# Patient Record
Sex: MALE | Race: WHITE | NOT HISPANIC OR LATINO | URBAN - METROPOLITAN AREA
[De-identification: names, ages, dates, MRNs, and addresses within clinical notes are randomized per-mention and may not be internally consistent; named-entity substitution may affect disease eponyms.]

---

## 2017-05-05 ENCOUNTER — EMERGENCY (EMERGENCY)
Facility: HOSPITAL | Age: 47
LOS: 0 days | Discharge: HOME | End: 2017-05-05

## 2017-06-15 ENCOUNTER — EMERGENCY (EMERGENCY)
Facility: HOSPITAL | Age: 47
LOS: 1 days | Discharge: HOME | End: 2017-06-15
Admitting: EMERGENCY MEDICINE
Payer: MEDICAID

## 2017-06-15 VITALS
HEART RATE: 71 BPM | DIASTOLIC BLOOD PRESSURE: 98 MMHG | WEIGHT: 229.94 LBS | TEMPERATURE: 98 F | RESPIRATION RATE: 16 BRPM | OXYGEN SATURATION: 95 % | SYSTOLIC BLOOD PRESSURE: 153 MMHG

## 2017-06-15 DIAGNOSIS — Z79.2 LONG TERM (CURRENT) USE OF ANTIBIOTICS: ICD-10-CM

## 2017-06-15 DIAGNOSIS — M79.671 PAIN IN RIGHT FOOT: ICD-10-CM

## 2017-06-15 DIAGNOSIS — Z79.1 LONG TERM (CURRENT) USE OF NON-STEROIDAL ANTI-INFLAMMATORIES (NSAID): ICD-10-CM

## 2017-06-15 DIAGNOSIS — M79.672 PAIN IN LEFT FOOT: ICD-10-CM

## 2017-06-15 DIAGNOSIS — Z87.891 PERSONAL HISTORY OF NICOTINE DEPENDENCE: ICD-10-CM

## 2017-06-15 PROCEDURE — 99283 EMERGENCY DEPT VISIT LOW MDM: CPT | Mod: 25

## 2017-06-15 RX ORDER — LEVOTHYROXINE SODIUM 125 MCG
1 TABLET ORAL
Qty: 0 | Refills: 0 | COMMUNITY

## 2017-06-15 RX ORDER — ACETAMINOPHEN 500 MG
650 TABLET ORAL ONCE
Qty: 0 | Refills: 0 | Status: COMPLETED | OUTPATIENT
Start: 2017-06-15 | End: 2017-06-15

## 2017-06-15 RX ADMIN — Medication 650 MILLIGRAM(S): at 23:40

## 2017-06-15 NOTE — ED PROVIDER NOTE - MEDICAL DECISION MAKING DETAILS
patient homeless with BL foot pain requesting to sleep in ED. advised elevation and follow up with PCP

## 2017-06-15 NOTE — ED PROVIDER NOTE - OBJECTIVE STATEMENT
patient homeless lives in shelter presents with BL foot pain from excessive walking. denies trauma or fall. denies rash, lesions, fever, chills, nightsweats. patient is requesting to sleep in ED as he does not like his shelter

## 2017-06-28 DIAGNOSIS — E03.9 HYPOTHYROIDISM, UNSPECIFIED: ICD-10-CM

## 2017-06-28 DIAGNOSIS — M79.89 OTHER SPECIFIED SOFT TISSUE DISORDERS: ICD-10-CM

## 2017-06-28 DIAGNOSIS — M79.673 PAIN IN UNSPECIFIED FOOT: ICD-10-CM

## 2017-06-28 DIAGNOSIS — M54.9 DORSALGIA, UNSPECIFIED: ICD-10-CM

## 2017-06-28 DIAGNOSIS — I10 ESSENTIAL (PRIMARY) HYPERTENSION: ICD-10-CM

## 2017-06-28 DIAGNOSIS — Z79.899 OTHER LONG TERM (CURRENT) DRUG THERAPY: ICD-10-CM

## 2020-08-13 NOTE — ED ADULT NURSE NOTE - NS ED NURSE DISCH DISPOSITION
Discharged Consent (Scalp)/Introductory Paragraph: The rationale for Mohs was explained to the patient and consent was obtained. The risks, benefits and alternatives to therapy were discussed in detail. Specifically, the risks of changes in hair growth pattern secondary to repair, infection, scarring, bleeding, prolonged wound healing, incomplete removal, allergy to anesthesia, nerve injury and recurrence were addressed. Prior to the procedure, the treatment site was clearly identified and confirmed by the patient. All components of Universal Protocol/PAUSE Rule completed.

## 2020-09-21 ENCOUNTER — EMERGENCY (EMERGENCY)
Facility: HOSPITAL | Age: 50
LOS: 1 days | Discharge: HOME | End: 2020-09-21
Attending: EMERGENCY MEDICINE | Admitting: EMERGENCY MEDICINE
Payer: MEDICAID

## 2020-09-21 VITALS
SYSTOLIC BLOOD PRESSURE: 132 MMHG | TEMPERATURE: 98 F | RESPIRATION RATE: 18 BRPM | HEART RATE: 98 BPM | OXYGEN SATURATION: 98 % | HEIGHT: 72 IN | WEIGHT: 274.92 LBS | DIASTOLIC BLOOD PRESSURE: 88 MMHG

## 2020-09-21 DIAGNOSIS — M79.671 PAIN IN RIGHT FOOT: ICD-10-CM

## 2020-09-21 DIAGNOSIS — M79.672 PAIN IN LEFT FOOT: ICD-10-CM

## 2020-09-21 PROCEDURE — 99283 EMERGENCY DEPT VISIT LOW MDM: CPT

## 2020-09-21 NOTE — ED PROVIDER NOTE - PATIENT PORTAL LINK FT
You can access the FollowMyHealth Patient Portal offered by [facility] by registering at the following website: [email or web address]. By joining [employer]’s FollowMyHealth portal, you will also be able to view your health information using other applications (apps) compatible with our system.

## 2020-09-21 NOTE — ED PROVIDER NOTE - NSFOLLOWUPINSTRUCTIONS_ED_ALL_ED_FT
Follow up with your primary care doctor or clinics listed below if you do not have a doctor  [address] [address]  To make an appointment, call [phone]   [facility]  Address: [address] [place] [id number]  Appointment Center: [phone] ([phone])  Return immediately for any new or worsening symptoms or any new concerns

## 2020-09-21 NOTE — ED PROVIDER NOTE - OBJECTIVE STATEMENT
49 yom pw bl foot pain, "for a while" but worse x 1 wk, does not endorse specific details of sx.  pt states he thinks it's from neuropathy but never formally diagnosed.  denies any trauma/injury.  when asked about other medical history, only wants to talk about the foot pain.

## 2020-09-21 NOTE — ED PROVIDER NOTE - CLINICAL SUMMARY MEDICAL DECISION MAKING FREE TEXT BOX
foot pain "for a while" but worsening x 1 wk, denies any trauma/injury, does not endorse other details, does not endorse PMH, does not wish to take off shoes/socks for evaluation, understands workup is incomplete and may miss potentially life threatening conditions.  understands risks of incomplete evaluation, which can include but not limited to permanent disability, and/or death.  pt appear nad, ao x 3, clear speech, verbalizes understanding of discussion.

## 2020-09-21 NOTE — ED ADULT TRIAGE NOTE - CHIEF COMPLAINT QUOTE
Pt walks in w/ EMS c/o B/L foot pain r/t neuropathy. Pt took 300mg gabapentin 20 min PTA. Pt denies recent injury. PMH of HTN and hypothyroidism.

## 2020-09-21 NOTE — ED PROVIDER NOTE - PHYSICAL EXAMINATION
Physical Exam  GEN: Awake, alert, non-toxic appearing, NCAT  EYES: full EOMI,  ENT: External inspection normal, normal voice,   HEAD: atraumatic  NECK: FROM neck, supple,   RESP: no tachypnea, no hypoxia, no resp distress,  MSK: does not take off shoes/socks for evaluation, seen ambulating steadily w/o difficulty  SKIN: does not take off shoes/socks for evaluation,

## 2020-09-21 NOTE — ED PROVIDER NOTE - PROGRESS NOTE DETAILS
at time of dc, pt amenable to take off shoes/socks to RN.  on eval, normal skin color, normal cap refill, pedal pulses palpable bl equal, no gangrene, no bulla, no erythema, compartment soft, normal ankle dorsiplantarflexion, no acute ischemia or evidence of infection, discussed f/u w/ outpatient primary care at time of dc, pt amenable to take off shoes/socks to RN.  on eval, normal skin color, normal cap refill, pedal pulses palpable bl equal, no gangrene, no bulla, no erythema, compartment soft, normal ankle dorsiplantarflexion, no acute ischemia or evidence of infection, discussed f/u w/ outpatient primary care.  pt still did not endorse more details of pmh/history.

## 2020-09-21 NOTE — ED ADULT TRIAGE NOTE - PAIN RATING/NUMBER SCALE (0-10): REST
Pt had a Td 8-9-19     Called pt cell # notified of difference of Ts vs Tdap    She is not worried about pertussis as no grandchildren or none expected.  She will see / consider when her next tetanus is due           6

## 2020-09-25 ENCOUNTER — EMERGENCY (EMERGENCY)
Facility: HOSPITAL | Age: 50
LOS: 1 days | Discharge: HOME | End: 2020-09-25
Attending: EMERGENCY MEDICINE | Admitting: EMERGENCY MEDICINE
Payer: MEDICAID

## 2020-09-25 VITALS
HEART RATE: 88 BPM | SYSTOLIC BLOOD PRESSURE: 132 MMHG | RESPIRATION RATE: 18 BRPM | DIASTOLIC BLOOD PRESSURE: 78 MMHG | OXYGEN SATURATION: 99 % | TEMPERATURE: 98 F

## 2020-09-25 VITALS
RESPIRATION RATE: 18 BRPM | SYSTOLIC BLOOD PRESSURE: 113 MMHG | TEMPERATURE: 98 F | OXYGEN SATURATION: 98 % | HEART RATE: 71 BPM | WEIGHT: 274.92 LBS | HEIGHT: 72 IN | DIASTOLIC BLOOD PRESSURE: 73 MMHG

## 2020-09-25 LAB
ALBUMIN SERPL ELPH-MCNC: 3.8 G/DL — SIGNIFICANT CHANGE UP (ref 3.4–5)
ALP SERPL-CCNC: 57 U/L — SIGNIFICANT CHANGE UP (ref 40–120)
ALT FLD-CCNC: 34 U/L — SIGNIFICANT CHANGE UP (ref 12–42)
ANION GAP SERPL CALC-SCNC: 7 MMOL/L — LOW (ref 9–16)
AST SERPL-CCNC: 29 U/L — SIGNIFICANT CHANGE UP (ref 15–37)
BASOPHILS # BLD AUTO: 0.08 K/UL — SIGNIFICANT CHANGE UP (ref 0–0.2)
BASOPHILS NFR BLD AUTO: 1 % — SIGNIFICANT CHANGE UP (ref 0–2)
BILIRUB SERPL-MCNC: 0.4 MG/DL — SIGNIFICANT CHANGE UP (ref 0.2–1.2)
BUN SERPL-MCNC: 27 MG/DL — HIGH (ref 7–23)
CALCIUM SERPL-MCNC: 9.2 MG/DL — SIGNIFICANT CHANGE UP (ref 8.5–10.5)
CHLORIDE SERPL-SCNC: 103 MMOL/L — SIGNIFICANT CHANGE UP (ref 96–108)
CO2 SERPL-SCNC: 27 MMOL/L — SIGNIFICANT CHANGE UP (ref 22–31)
CREAT SERPL-MCNC: 1.17 MG/DL — SIGNIFICANT CHANGE UP (ref 0.5–1.3)
EOSINOPHIL # BLD AUTO: 0.28 K/UL — SIGNIFICANT CHANGE UP (ref 0–0.5)
EOSINOPHIL NFR BLD AUTO: 3.6 % — SIGNIFICANT CHANGE UP (ref 0–6)
GLUCOSE BLDC GLUCOMTR-MCNC: 113 MG/DL — HIGH (ref 70–99)
GLUCOSE SERPL-MCNC: 112 MG/DL — HIGH (ref 70–99)
HCT VFR BLD CALC: 39.7 % — SIGNIFICANT CHANGE UP (ref 39–50)
HGB BLD-MCNC: 13.2 G/DL — SIGNIFICANT CHANGE UP (ref 13–17)
IMM GRANULOCYTES NFR BLD AUTO: 0.3 % — SIGNIFICANT CHANGE UP (ref 0–1.5)
LYMPHOCYTES # BLD AUTO: 3.16 K/UL — SIGNIFICANT CHANGE UP (ref 1–3.3)
LYMPHOCYTES # BLD AUTO: 40.5 % — SIGNIFICANT CHANGE UP (ref 13–44)
MCHC RBC-ENTMCNC: 29.5 PG — SIGNIFICANT CHANGE UP (ref 27–34)
MCHC RBC-ENTMCNC: 33.2 GM/DL — SIGNIFICANT CHANGE UP (ref 32–36)
MCV RBC AUTO: 88.6 FL — SIGNIFICANT CHANGE UP (ref 80–100)
MONOCYTES # BLD AUTO: 0.89 K/UL — SIGNIFICANT CHANGE UP (ref 0–0.9)
MONOCYTES NFR BLD AUTO: 11.4 % — SIGNIFICANT CHANGE UP (ref 2–14)
NEUTROPHILS # BLD AUTO: 3.37 K/UL — SIGNIFICANT CHANGE UP (ref 1.8–7.4)
NEUTROPHILS NFR BLD AUTO: 43.2 % — SIGNIFICANT CHANGE UP (ref 43–77)
NRBC # BLD: 0 /100 WBCS — SIGNIFICANT CHANGE UP (ref 0–0)
PLATELET # BLD AUTO: 263 K/UL — SIGNIFICANT CHANGE UP (ref 150–400)
POTASSIUM SERPL-MCNC: 3.6 MMOL/L — SIGNIFICANT CHANGE UP (ref 3.5–5.3)
POTASSIUM SERPL-SCNC: 3.6 MMOL/L — SIGNIFICANT CHANGE UP (ref 3.5–5.3)
PROT SERPL-MCNC: 7.4 G/DL — SIGNIFICANT CHANGE UP (ref 6.4–8.2)
RBC # BLD: 4.48 M/UL — SIGNIFICANT CHANGE UP (ref 4.2–5.8)
RBC # FLD: 12.3 % — SIGNIFICANT CHANGE UP (ref 10.3–14.5)
SODIUM SERPL-SCNC: 137 MMOL/L — SIGNIFICANT CHANGE UP (ref 132–145)
WBC # BLD: 7.8 K/UL — SIGNIFICANT CHANGE UP (ref 3.8–10.5)
WBC # FLD AUTO: 7.8 K/UL — SIGNIFICANT CHANGE UP (ref 3.8–10.5)

## 2020-09-25 PROCEDURE — 71045 X-RAY EXAM CHEST 1 VIEW: CPT | Mod: 26

## 2020-09-25 PROCEDURE — 99284 EMERGENCY DEPT VISIT MOD MDM: CPT | Mod: 25

## 2020-09-25 RX ORDER — ALBUTEROL 90 UG/1
2 AEROSOL, METERED ORAL
Qty: 1 | Refills: 0
Start: 2020-09-25 | End: 2020-10-04

## 2020-09-25 RX ORDER — AZITHROMYCIN 500 MG/1
1 TABLET, FILM COATED ORAL
Qty: 4 | Refills: 0
Start: 2020-09-25 | End: 2020-09-28

## 2020-09-25 RX ORDER — AZITHROMYCIN 500 MG/1
500 TABLET, FILM COATED ORAL ONCE
Refills: 0 | Status: COMPLETED | OUTPATIENT
Start: 2020-09-25 | End: 2020-09-25

## 2020-09-25 RX ORDER — ALBUTEROL 90 UG/1
2 AEROSOL, METERED ORAL ONCE
Refills: 0 | Status: COMPLETED | OUTPATIENT
Start: 2020-09-25 | End: 2020-09-25

## 2020-09-25 RX ADMIN — Medication 200 MILLIGRAM(S): at 22:25

## 2020-09-25 RX ADMIN — AZITHROMYCIN 500 MILLIGRAM(S): 500 TABLET, FILM COATED ORAL at 22:25

## 2020-09-25 RX ADMIN — ALBUTEROL 2 PUFF(S): 90 AEROSOL, METERED ORAL at 22:25

## 2020-09-25 NOTE — ED ADULT NURSE NOTE - CHPI ED NUR SYMPTOMS NEG
no confusion/no weakness/no nausea/no fever/no vomiting/no blurred vision/no change in level of consciousness/no numbness

## 2020-09-25 NOTE — ED ADULT TRIAGE NOTE - NS ED TRIAGE AVPU SCALE
Detail Level: Zone Alert-The patient is alert, awake and responds to voice. The patient is oriented to time, place, and person. The triage nurse is able to obtain subjective information.

## 2020-09-25 NOTE — ED ADULT TRIAGE NOTE - CHIEF COMPLAINT QUOTE
Pt BIBA c/o dizziness and weakness since today. Pt states he has been "on his feet" for 3 days straight and is exhausted. Pt also c/o B/L foot pain. Per EMS, pt ambulating only with 2 person assist. Pt denies falling down/hitting head. PMH of HTN and hypothyroidism.

## 2020-09-25 NOTE — ED PROVIDER NOTE - OBJECTIVE STATEMENT
49 y.o. male with c/o 2-3 days dry cough, weakness dizziness, he is homeless and sleeping in the street, denies fever/chills, no vomiting.

## 2020-09-25 NOTE — ED PROVIDER NOTE - PATIENT PORTAL LINK FT
You can access the FollowMyHealth Patient Portal offered by [facility] by registering at the following website: [email or web address]. By joining [employer]’s FollowMyHealth portal, you will also be able to view your health information using other applications (apps) compatible with our system. You can access the FollowMyHealth Patient Portal offered by [facility] by registering at the following website: [email or web address]. By joining [employer]’s FollowMyHealth portal, you will also be able to view your health information using other applications (apps) compatible with our system.

## 2020-09-25 NOTE — ED PROVIDER NOTE - NSFOLLOWUPINSTRUCTIONS_ED_ALL_ED_FT
Acute Bronchitis    WHAT YOU NEED TO KNOW:    Acute bronchitis is swelling and irritation in the air passages of your lungs. This irritation may cause you to cough or have other breathing problems. Acute bronchitis often starts because of another illness, such as a cold or the flu. The illness spreads from your nose and throat to your windpipe and airways. Bronchitis is often called a chest cold. Acute bronchitis lasts about 3 to 6 weeks and is usually not a serious illness. Your cough can last for several weeks.     DISCHARGE INSTRUCTIONS:    Return to the emergency department if:     You cough up blood.      Your lips or fingernails turn blue.      You feel like you are not getting enough air when you breathe.    Contact your healthcare provider if:     You have a fever.      Your breathing problems do not go away or get worse.      Your cough does not get better within 4 weeks.      You have questions or concerns about your condition or care.    Self-care:     Get more rest. Rest helps your body to heal. Slowly start to do more each day. Rest when you feel it is needed.      Avoid irritants in the air. Avoid chemicals, fumes, and dust. Wear a face mask if you must work around dust or fumes. Stay inside on days when air pollution levels are high. If you have allergies, stay inside when pollen counts are high. Do not use aerosol products, such as spray-on deodorant, bug spray, and hair spray.      Do not smoke or be around others who smoke. Nicotine and other chemicals in cigarettes and cigars damages the cilia that move mucus out of your lungs. Ask your healthcare provider for information if you currently smoke and need help to quit. E-cigarettes or smokeless tobacco still contain nicotine. Talk to your healthcare provider before you use these products.       Drink liquids as directed. Liquids help keep your air passages moist and help you cough up mucus. You may need to drink more liquids when you have acute bronchitis. Ask how much liquid to drink each day and which liquids are best for you.      Use a humidifier or vaporizer. Use a cool mist humidifier or a vaporizer to increase air moisture in your home. This may make it easier for you to breathe and help decrease your cough.     Decrease risk for acute bronchitis:     Get the vaccinations you need. Ask your healthcare provider if you should get vaccinated against the flu or pneumonia.      Prevent the spread of germs. You can decrease your risk of acute bronchitis and other illnesses by doing the following:   Wash your hands often with soap and water. Carry germ-killing hand lotion or gel with you. You can use the lotion or gel to clean your hands when soap and water are not available.      Do not touch your eyes, nose, or mouth unless you have washed your hands first.      Always cover your mouth when you cough to prevent the spread of germs. It is best to cough into a tissue or your shirt sleeve instead of into your hand. Ask those around you cover their mouths when they cough.      Try to avoid people who have a cold or the flu. If you are sick, stay away from others as much as possible.    Medicines: Your healthcare provider may give you any of the following:     Ibuprofen or acetaminophen are medicines that help lower your fever. They are available without a doctor's order. Ask your healthcare provider which medicine is right for you. Ask how much to take and how often to take it. Follow directions. These medicines can cause stomach bleeding if not taken correctly. Ibuprofen can cause kidney damage. Do not take ibuprofen if you have kidney disease, an ulcer, or allergies to aspirin. Acetaminophen can cause liver damage. Do not take more than 4,000 milligrams in 24 hours.       Decongestants help loosen mucus in your lungs and make it easier to cough up. This can help you breathe easier.      Cough suppressants decrease your urge to cough. If your cough produces mucus, do not take a cough suppressant unless your healthcare provider tells you to. Your healthcare provider may suggest that you take a cough suppressant at night so you can rest.      Inhalers may be given. Your healthcare provider may give you one or more inhalers to help you breathe easier and cough less. An inhaler gives your medicine to open your airways. Ask your healthcare provider to show you how to use your inhaler correctly.Metered Dose Inhaler           Take your medicine as directed. Contact your healthcare provider if you think your medicine is not helping or if you have side effects. Tell him of her if you are allergic to any medicine. Keep a list of the medicines, vitamins, and herbs you take. Include the amounts, and when and why you take them. Bring the list or the pill bottles to follow-up visits. Carry your medicine list with you in case of an emergency.    Follow up with your healthcare provider as directed: Write down questions you have so you will remember to ask them during your follow-up visits    You may have Coronavirus, or any of the other many colds that are going around now.     COVID-19 testing are currently being prioritized at [facility] for admitted patients.     All patients that are stable are being discharged from the ED, even if there is a concern for coronavirus. Since you are stable, you are being discharged. Your test results may take 5-7 days. You will get a phone call for postive results. Not for negative results.  Please check the patient online portal for results. Please follow the instructions on provided coronavirus discharge educational forms and self quarantine for 14 days.     In addition, you have been placed on our surveillance tracker. Return to the ED immediately if you have shortness of breath, fever, pain, weakness, vomiting any concerns.    1. STAY HOME for 14 DAYS  2. Minimize Human contact to ONLY ESSENTIAL  3. Every time you wash your hands, sing the HAPPY BIRTHDAY Song so you know you're washing long enough.  Make sure to scrub the webspace between your fingers.  4. DRINK 1-3 Liters of fluids day x at least 5 days.  To remain hydrated. Your fatigue, lightheadedness, and body aches will decrease and your fever has a better chance of breaking if you are well hydrated.    5. For your Fever and Body aches takes Tylenol 650-100mg every 4-6h (max 4000mg/day). Try not to use ibuprofen, aspirin or naproxen (Advil, Motrin or Aleve) as these may worsen Coronavirus infection.  6. Use an inhaler for mild shortness of breath and cough  7. RETURN TO THE ER IMMEDIATELY IF YOU HAVE WORSENING SHORTNESS OF BREATH  8. TAKE THE FOLLOWING SUPPLEMENTS DAILY.        VITAMIN C 1000MG ONCE DAILY.        VITAMIN D 200IU ONCE DAILY.        ZINC 50MG ONCE DAILY.

## 2020-09-25 NOTE — ED PROVIDER NOTE - PROGRESS NOTE DETAILS
Accept sign out from Dr. Hillman.  pt awaiting covid result prior to discharge.  Currently resting comfortably.

## 2020-09-26 ENCOUNTER — EMERGENCY (EMERGENCY)
Facility: HOSPITAL | Age: 50
LOS: 0 days | Discharge: LEFT BEFORE DISCHARGE | End: 2020-09-26
Attending: EMERGENCY MEDICINE | Admitting: EMERGENCY MEDICINE
Payer: MEDICAID

## 2020-09-26 VITALS
RESPIRATION RATE: 20 BRPM | TEMPERATURE: 97 F | DIASTOLIC BLOOD PRESSURE: 69 MMHG | WEIGHT: 270.07 LBS | HEART RATE: 79 BPM | SYSTOLIC BLOOD PRESSURE: 119 MMHG | HEIGHT: 72 IN | OXYGEN SATURATION: 97 %

## 2020-09-26 DIAGNOSIS — E78.5 HYPERLIPIDEMIA, UNSPECIFIED: ICD-10-CM

## 2020-09-26 DIAGNOSIS — Z79.899 OTHER LONG TERM (CURRENT) DRUG THERAPY: ICD-10-CM

## 2020-09-26 DIAGNOSIS — M79.606 PAIN IN LEG, UNSPECIFIED: ICD-10-CM

## 2020-09-26 DIAGNOSIS — F17.200 NICOTINE DEPENDENCE, UNSPECIFIED, UNCOMPLICATED: ICD-10-CM

## 2020-09-26 DIAGNOSIS — I10 ESSENTIAL (PRIMARY) HYPERTENSION: ICD-10-CM

## 2020-09-26 DIAGNOSIS — R53.1 WEAKNESS: ICD-10-CM

## 2020-09-26 LAB — SARS-COV-2 RNA SPEC QL NAA+PROBE: SIGNIFICANT CHANGE UP

## 2020-09-26 PROCEDURE — 99284 EMERGENCY DEPT VISIT MOD MDM: CPT

## 2020-09-26 RX ORDER — KETOROLAC TROMETHAMINE 30 MG/ML
30 SYRINGE (ML) INJECTION ONCE
Refills: 0 | Status: DISCONTINUED | OUTPATIENT
Start: 2020-09-26 | End: 2020-09-26

## 2020-09-26 RX ADMIN — Medication 30 MILLIGRAM(S): at 19:00

## 2020-09-26 NOTE — ED ADULT NURSE NOTE - PMH
High cholesterol    HTN (hypertension)    Neuropathy    No pertinent past medical history    Thyroid disease

## 2020-09-26 NOTE — ED PROVIDER NOTE - ATTENDING CONTRIBUTION TO CARE
I personally evaluated the patient. I reviewed the Resident’s or Physician Assistant’s note (as assigned above), and agree with the findings and plan except as documented in my note.  Chart reviewed. H/O HTN, HLD, neuropathy, presents with leg pain for a while. No trauma. Exam shows alert patient in no distress, HEENT NCAT, lungs clear, RR S1S2, abdomen soft Nt +BS, no CCE, 2+ pulses, neuro A&OX3 no deficits.

## 2020-09-28 DIAGNOSIS — Z79.899 OTHER LONG TERM (CURRENT) DRUG THERAPY: ICD-10-CM

## 2020-09-28 DIAGNOSIS — J20.9 ACUTE BRONCHITIS, UNSPECIFIED: ICD-10-CM

## 2020-09-28 DIAGNOSIS — R42 DIZZINESS AND GIDDINESS: ICD-10-CM

## 2020-09-28 DIAGNOSIS — Z20.828 CONTACT WITH AND (SUSPECTED) EXPOSURE TO OTHER VIRAL COMMUNICABLE DISEASES: ICD-10-CM

## 2021-08-22 ENCOUNTER — EMERGENCY (EMERGENCY)
Facility: HOSPITAL | Age: 51
LOS: 0 days | Discharge: HOME | End: 2021-08-22
Attending: STUDENT IN AN ORGANIZED HEALTH CARE EDUCATION/TRAINING PROGRAM | Admitting: STUDENT IN AN ORGANIZED HEALTH CARE EDUCATION/TRAINING PROGRAM
Payer: MEDICAID

## 2021-08-22 VITALS
SYSTOLIC BLOOD PRESSURE: 148 MMHG | DIASTOLIC BLOOD PRESSURE: 76 MMHG | RESPIRATION RATE: 18 BRPM | HEART RATE: 70 BPM | HEIGHT: 72 IN | OXYGEN SATURATION: 97 % | TEMPERATURE: 98 F

## 2021-08-22 DIAGNOSIS — M79.671 PAIN IN RIGHT FOOT: ICD-10-CM

## 2021-08-22 DIAGNOSIS — Z86.69 PERSONAL HISTORY OF OTHER DISEASES OF THE NERVOUS SYSTEM AND SENSE ORGANS: ICD-10-CM

## 2021-08-22 DIAGNOSIS — E78.00 PURE HYPERCHOLESTEROLEMIA, UNSPECIFIED: ICD-10-CM

## 2021-08-22 DIAGNOSIS — M79.672 PAIN IN LEFT FOOT: ICD-10-CM

## 2021-08-22 DIAGNOSIS — Z86.39 PERSONAL HISTORY OF OTHER ENDOCRINE, NUTRITIONAL AND METABOLIC DISEASE: ICD-10-CM

## 2021-08-22 DIAGNOSIS — Z79.890 HORMONE REPLACEMENT THERAPY: ICD-10-CM

## 2021-08-22 DIAGNOSIS — I10 ESSENTIAL (PRIMARY) HYPERTENSION: ICD-10-CM

## 2021-08-22 DIAGNOSIS — E78.5 HYPERLIPIDEMIA, UNSPECIFIED: ICD-10-CM

## 2021-08-22 DIAGNOSIS — G89.29 OTHER CHRONIC PAIN: ICD-10-CM

## 2021-08-22 PROBLEM — G62.9 POLYNEUROPATHY, UNSPECIFIED: Chronic | Status: ACTIVE | Noted: 2020-09-26

## 2021-08-22 PROCEDURE — 99284 EMERGENCY DEPT VISIT MOD MDM: CPT

## 2021-08-22 RX ORDER — IBUPROFEN 200 MG
800 TABLET ORAL ONCE
Refills: 0 | Status: COMPLETED | OUTPATIENT
Start: 2021-08-22 | End: 2021-08-22

## 2021-08-22 RX ORDER — GABAPENTIN 400 MG/1
300 CAPSULE ORAL ONCE
Refills: 0 | Status: COMPLETED | OUTPATIENT
Start: 2021-08-22 | End: 2021-08-22

## 2021-08-22 RX ADMIN — Medication 800 MILLIGRAM(S): at 18:07

## 2021-08-22 RX ADMIN — GABAPENTIN 300 MILLIGRAM(S): 400 CAPSULE ORAL at 18:08

## 2021-08-22 NOTE — ED PROVIDER NOTE - ATTENDING CONTRIBUTION TO CARE
50 yr old m w/ a pmh significant for HTN, HLD, neuropathy, who presents with bilateral feet pain. Pt states that the pain is chronic and due to his neuropathy. Pt denies any trauma to the area. Pt states that he would like to get a referral for pcp, and neurology for this complaint. Of note, pt states that he did not take his gabapentin today.   Pt denies any other medical complaints.     VITAL SIGNS: I have reviewed nursing notes and confirm.  CONSTITUTIONAL: non-toxic, well appearing  SKIN: no rash, no petechiae.  EYES: EOMI, pink conjunctiva, anicteric  ENT: tongue midline, no exudates, MMM  NECK: Supple; no meningismus, no JVD  RESP: no respiratory distress  EXT: Normal ROM x4. No edema. No calves tenderness. dp+2 sensation intact in the lower extremities, no signs of trauma to the feet bilaterally, no ulcerations, abrasions or lacerations   NEURO: Alert, oriented. CN2-12 intact, equal strength bilaterally, nl gait.  PSYCH: Cooperative, appropriate.    a/p  50 yr old m that presents with chronic bilateral foot pain. will give pt pain meds. dc pt with pcp and neurology follow up and strict return precautions.

## 2021-08-22 NOTE — ED ADULT TRIAGE NOTE - CHIEF COMPLAINT QUOTE
BIBA as per patient "Both my feet hurt me on and off for years, they don't really know why, both feet hurt today. It's the same pain I get on and off". Denies fall/trauma.

## 2021-08-22 NOTE — ED PROVIDER NOTE - CLINICAL SUMMARY MEDICAL DECISION MAKING FREE TEXT BOX
50 yr old m that presents with chronic bilateral foot pain. will give pt pain meds. dc pt with pcp and neurology follow up and strict return precautions.

## 2021-08-22 NOTE — ED PROVIDER NOTE - NSICDXPASTMEDICALHX_GEN_ALL_CORE_FT
PAST MEDICAL HISTORY:  High cholesterol     HTN (hypertension)     Neuropathy     No pertinent past medical history     Thyroid disease

## 2021-08-22 NOTE — ED PROVIDER NOTE - OBJECTIVE STATEMENT
Pt is a 49y/o male with a pmh significant for HTN, HLD, neuropathy, who presents with bilateral feet pain. Pt states that the pain is chronic and due to his neuropathy. Pt states that he did not take his gabapentin today. Denies fever, chills, trauma, weakness, numbness

## 2021-08-22 NOTE — ED PROVIDER NOTE - PATIENT PORTAL LINK FT
You can access the FollowMyHealth Patient Portal offered by [facility] by registering at the following website: [email or web address]. By joining [employer]’s FollowMyHealth portal, you will also be able to view your health information using other applications (apps) compatible with our system.

## 2021-08-22 NOTE — ED PROVIDER NOTE - NSFOLLOWUPCLINICS_GEN_ALL_ED_FT
[facility]  Podiatry  .  [place]   Phone: [phone]  Fax:

## 2021-08-22 NOTE — ED PROVIDER NOTE - NS ED ROS FT
MS:  + foot pain No myalgia, muscle weakness, back pain.  Neuro:  No headache or weakness.  No LOC.  Skin:  No skin rash.   Endocrine: No history of thyroid disease or diabetes.  Except as documented in the HPI,  all other systems are negative.

## 2021-10-28 NOTE — ED ADULT NURSE NOTE - PT NEEDS ASSIST
unable to interview pt. at this time. will attempt to give education on diet at a later date. Available prn.
no

## 2022-01-12 NOTE — ED ADULT TRIAGE NOTE - RESPIRATORY RATE (BREATHS/MIN)
18 Retinoid Dermatitis Aggressive Treatment: I recommended more frequent application of Cetaphil or CeraVe to the areas of dermatitis. I also prescribed a topical steroid for twice daily use until the dermatitis resolves.

## 2024-12-31 NOTE — ED ADULT NURSE NOTE - FALL HARM RISK CONCLUSION
During your pre-operative assessment today you received a sleep apnea screening.  Based on your score, it is recommended that you follow up with your primary care provider for further evaluation.       Elevate left hand above elbow, elbow above heart all times.  Exercise fingers and thumb, bending and straightening the fingers  No lifting, pushing pulling >1lb  Avoid pressure on palm  May use fingertips as tolerated.   Keep dressing clean,dry, intact until Friday. Remove and replace with band-aid or gauze.   Cover w/ plastic bag for shower to keep dry.  Work: right hand full, left hand assist only    Follow-up with Dr. Burton's Nurse Practitioner, Emely Lee, at 2 weeks for suture removal.      If you have any concerns following your Procedure/Surgery and cannot reach your physician's office, please contact [facility] at [phone] for assistance.        Care After Anesthesia or Sedation    After discharge   Due to the medicine given, someone must drive you home. It is strongly recommended to have someone stay with you at home the day of discharge and the night after surgery.   If you have infants or small children at home, please have someone help you for at least 24 hours after your surgery.   Do not drive for at least 24 hours after surgery (or as told by your doctor).   Rest for the remainder of the day. Go up and down stairs slowly.   Do not smoke after surgery. Smoking can delay healing.   Do not operate heavy or potential harmful equipment.   Do not make legally binding decisions.   Do not drink alcohol for 24 hours.    Diet   Drink plenty of fluids (6 to 8 glasses of water a day).  Resume your regular diet as able.  Avoid greasy or spicy foods for 24 hours.   Start eating a bland diet (toast, gelatin, 7-up, hot cereal, crackers, sherbet, broth soup).      Nausea   Nausea may be expected for the first 24 to 48 hours.  Drink small amounts of fluids such as water or sports drink  Try  sucking on hard candy      Urination   The effects of anesthetics may cause some people to have trouble passing urine the day of surgery. Drink a lot of fluids to help prevent this.   Try to urinate within 8 hours of surgery.   If you are unable to pass urine and feel like you need to, call your doctor or the hospital.    Pain control   Some incisions are injected with a long acting local anesthetic; it will wear off in 4 to 6 hours. You can expect to have some pain at this time.   Treat your pain with the prescribed pain medicine before it wears off. Do not wait until your pain becomes severe.   Ask your nurse when you had your last pain medicine, so you know when you can take another one after you get home.    Call your doctor if you have:   Nausea and vomiting that does not stop   Fever over 101° F   Pain not relieved by pain medication   If you are unable to pass your urine or you have not passed urine in the last 8 hours.   Unusual changes in behavior   Dizziness   Hives  If you are not able to reach your doctor, you may call the emergency department.    Wound Healing: What You Should Know    Do I have an infection?  Your body may show signs your wound is infected. If you see one or more of these signs, please call your health care provider:   Redness and swelling - Increased redness and swelling around the wound (some redness is expected in the first 48 hours).   Warmth - The area around the wound is warmer than the surrounding skin.   Fever - Your temperature is above 101º F or stays above 100º F.   Odor - A new or stronger, sweet or foul smell coming from the wound.   Swelling - Swelling spreading to other areas.   Drainage - Large amounts of drainage that involves blood, clear fluid or pus from the wound. Or, the drainage amount increases or changes color. (It is normal to have a small amount of drainage.)   Pain - Increase in your pain or change in the location of the pain.   Separation - Any place where  the incision is  or opening.    How can I help prevent the spread of infection when I take care of my wound?  1. Use good handwashing techniques before and after contact with your wound. Here are the steps to follow:  Handwashing with soap and water:   Wet hands with warm water and apply soap.   Rub well over all surfaces for at least 15 seconds.   Rinse well under water.   Dry hands with clean towels.   Turn off faucet with clean towels to prevent reinfecting hands.  Handwashing with a waterless alcohol-based product or gel:  Apply approximately a nickel sized amount of product to palm of hand.  Rub hands together, covering all surfaces including nails, until product evaporates, about 10 to 15 seconds.  Use clean latex or vinyl gloves if cleaning or touching wound surface.  Keep nails short and remove nail polish. Long nails or polish can harbor bacteria.  Keep jewelry clean or remove during wound care.  Use hypoallergenic lotions with anti-bacterial agents on skin surrounding wound to maintain moisture and prevent irritation.    What else can I do to help my wound heal quickly and prevent other wounds?   Stay active - Activity and exercise promote good circulation, which leads to wound healing.   Avoid smoking - Smoking narrows your blood vessels, which decreases both the blood supply to your wound and the amount of oxygen in your blood. This will decrease your ability to heal and increase your chance of infection.   Avoid alcohol - Alcohol decreases the ability of your body to fight infection.   Avoid sitting with legs or ankles crossed - This can compress the blood vessels in your legs and decrease the blood supply to your wound.   Eat a balanced diet - If you are unable to eat a balanced diet or required foods, you may benefit from a vitamin or mineral supplement.    Pain Management  Special Note: Be sure to follow any specific post-op instructions from your surgeon or nurse.     Once you’re home, you  may have some pain, since even minor surgery causes swelling and breakdown of tissue. When it comes to effective pain management, the tips you learned in the hospital also work at home. To get the best pain relief possible, remember these points:    Use your medication only as directed  If your pain is not relieved or if it gets worse, call your doctor.  If pain lessens, try taking your medication less often.  Remember that medications need time to work  Most pain relievers taken by mouth need at least 20-30 minutes to take effect.  Take pain medication at regular times as directed. Don’t wait until the pain gets bad to take it.  Time your medication  Try to time your medication so that you take it before beginning an activity, such as dressing or sitting at the table for dinner.  Taking your medication at night may help you get a good night’s rest.  Eat lots of fruit and vegetables  Constipation is a common side effect with some pain medications. Eating fruit and vegetables can help.  Drink lots of fluids.  Avoid drinking alcohol while taking pain medication  Mixing alcohol and pain medication can cause dizziness and slow your respiratory system. It can even be fatal.  Avoid driving or operating machinery while taking pain medication.  In addition, other ways to decrease pain     Relaxing techniques-slow, deep breathing, imagery, watching TV, listening to music      Heat or cold      Massage      Rest and changing your position in bed             Want to Say “Thank You” to a Nurse?  The [employer] Award® was created in memory of YESICA Ghosh by his family to say thank you to bedside nurses who provide an outstanding level of care.  Submit a nomination using any method below.     OR    https://aa.org/recognize       Universal Safety Interventions

## 2025-03-31 NOTE — ED ADULT TRIAGE NOTE - DOMESTIC TRAVEL HIGH RISK QUESTION
Endocrinology Clinic Note    Ramy Murphy Jr.  03/31/25  [id number]    CC:   Chief Complaint   Patient presents with    New Patient    Office Visit    Endocrine Problem     Referred by Emuchay, Ngozi, MD  Low serum testosterone level       PCP: Viola Jefferson DO HPI  75 year old male is here for evaluation of low testosterone.     No dx of sleep apnea.     He reports fatigue. He denies sx of erectile dysfunction or decreased libido.     PMH/PSH  Past Medical History:   Diagnosis Date    Acne 08/01/2013    Cholelithiasis 05/03/2019    Chronic fatigue 10/19/2018    Chronic pain of left knee 06/23/2020    Cough due to ACE inhibitor 10/31/2022    Diverticulosis 08/01/2013    DJD (degenerative joint disease) 08/01/2013    Formatting of this note might be different from the original. May 2009.    Essential and other specified forms of tremor 10/16/2002    Hematuria 08/01/2013    Dr Morris 10/20/2005.    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    Pericardial effusion (CMD) 05/03/2019    Personal history of tuberculosis 02/10/2023    Per pt when he was 2 yr old. He had surgery for it.      Pulmonary nodules 08/31/2018    S/P total knee arthroplasty, left 07/15/2021    Swelling of right lower extremity 11/06/2023    Syncope and collapse 11/06/2024    Tear of medial meniscus of left knee 07/15/2020    Ulcer, scrotum 08/26/2014     Past Surgical History:   Procedure Laterality Date    Appendectomy      Arthroscopy knee medial&latera Left     Lung lobectomy Left     piece of lung removed as infant. TB?    Rotator cuff repair Left      Patient Active Problem List   Diagnosis    Hypothyroidism    Benign essential hypertension    DVT (deep venous  thrombosis)  (CMD)    Hypercholesterolemia    Dizziness    Obesity (BMI 30.0-34.9)    Fatigue    Dyspnea on exertion    Syncope       FH  Family History   Problem Relation Age of Onset    Hypertension Mother        Social Hx  Social History     Tobacco Use    Smoking status: Never     Passive exposure: Past    Smokeless tobacco: Never   Vaping Use    Vaping status: never used   Substance Use Topics    Alcohol use: Not Currently    Drug use: Not Currently         ALLERGIES:  No Known Allergies      Review of Systems    Meds  Current Outpatient Medications   Medication Sig    apixaBAN (ELIQUIS) 5 MG Tab Take 1 tablet by mouth every 12 hours.    metoPROLOL succinate (TOPROL-XL) 25 MG 24 hr tablet Take 2 tablets by mouth daily.    levothyroxine 75 MCG tablet TAKE 1 TABLET BY MOUTH EVERY DAY    amLODIPine (NORVASC) 2.5 MG tablet Take 1 tablet by mouth daily.    atorvastatin (LIPITOR) 40 MG tablet Take 1 tablet by mouth nightly.    spironolactone (ALDACTONE) 25 MG tablet TAKE 1 TABLET BY MOUTH TWICE A DAY    losartan (COZAAR) 100 MG tablet Take 1 tablet by mouth daily.    ferrous sulfate 325 (65 FE) MG tablet Take 325 mg by mouth daily.    Multiple Vitamin (Multivitamin Adult) Tab Take 1 tablet by mouth daily.    VITAMIN D, CHOLECALCIFEROL, PO Take 1 tablet by mouth daily.    carbamide peroxide (DEBROX) 6.5 % otic solution Place 5 drops into both ears in the morning and 5 drops in the evening.    Blood Pressure Monitor Device 1 each daily. Please check your blood pressure daily    cholecalciferol (Vitamin D, Cholecalciferol,) 25 mcg (1,000 units) tablet Take 1 tablet by mouth daily.    Multiple Vitamins-Minerals (Multivitamin Adult) Tab Take 1 tablet by mouth daily.     No current facility-administered medications for this visit.       Physical Exam      Assessment/Plan   Problem List Items Addressed This Visit    None      ***      Kendra Rivera MD       tablet by mouth daily.    Multiple Vitamins-Minerals (Multivitamin Adult) Tab Take 1 tablet by mouth daily.     No current facility-administered medications for this visit.     Visit Vitals  BP (!) 153/77 (BP Location: LUE - Left upper extremity, Patient Position: Sitting, Cuff Size: Large Adult)   Pulse 62   Ht 6' 2\" (1.88 m)   Wt 113 kg (249 lb 1.9 oz)   BMI 31.99 kg/m²       Physical Exam    General: alert, no acute distress   HEENT: normal sclera, no proptosis  Resp: no respiratory distress   Ext: no LE edema   Skin: no rashes, lesions   Neuro: no gross focal deficits  Psych: calm, cooperative, answers questions appropriately       Assessment/Plan     Low testosterone   -discussed hypogonadism is dx with unequivocally low am testosterone levels x2 + symptoms of hypogonadism. Pt has fatigue and hx ED s/p penile implant   -repeat T labs at 8 am   -discussed TRT gel or injections. Pt prefers injections by RN in clinic   -discussed risks of TRT including stimulation of prostate cancer growth, erthrocytosis, worsening lipid profile, worsening of sleep apnea, CV events, decreased fertility. Pt expressed understanding   -discussed lipid panel, CBC and testoterone levels Q3 months for the first year on TRT, then Q6 months thereafter   -monitor PSA yearly   -recommend sleep study. Pt will discuss with PCP     HTN  Hyperaldosteronism  -dx of labs 3/2024 with renin 0.2, aldosterone 22.1 and spontaneous hypokalemia   -pt is on spironolactone 25 mg daily. BP elevated today. Recommend increasing spironolactone to 50 mg daily. Pt will discuss with PCP         RTC 4 months with labs       Kendra Rivera MD        ---------------------------------------------------  Post-clinic addendum     Follow-up labs 4/5 reviewed. Total and free T low, FSH and LH low      Recheck prolactin 8 am fasting   Check pituitary MRI   Start testosterone cypionate 75 mg IM Q3 weeks in clinic   Lab check 3 months      No